# Patient Record
Sex: MALE
[De-identification: names, ages, dates, MRNs, and addresses within clinical notes are randomized per-mention and may not be internally consistent; named-entity substitution may affect disease eponyms.]

---

## 2020-03-01 ENCOUNTER — HOSPITAL ENCOUNTER (EMERGENCY)
Dept: HOSPITAL 75 - ER | Age: 35
Discharge: HOME | End: 2020-03-01
Payer: SELF-PAY

## 2020-03-01 VITALS — WEIGHT: 250 LBS | HEIGHT: 70 IN | BODY MASS INDEX: 35.79 KG/M2

## 2020-03-01 VITALS — SYSTOLIC BLOOD PRESSURE: 119 MMHG | DIASTOLIC BLOOD PRESSURE: 71 MMHG

## 2020-03-01 DIAGNOSIS — F41.9: Primary | ICD-10-CM

## 2020-03-01 DIAGNOSIS — Z86.59: ICD-10-CM

## 2020-03-01 DIAGNOSIS — F13.239: ICD-10-CM

## 2020-03-01 PROCEDURE — 99283 EMERGENCY DEPT VISIT LOW MDM: CPT

## 2020-03-01 NOTE — ED GENERAL
General


Chief Complaint:  General Problems/Pain


Stated Complaint:  SHAKING, HAS BEEN HAVING TREMORS


Nursing Triage Note:  


PT AMBULATE TO TRIAGE WITH C/O RUNNING OUT OF HIS CLONAZAPAM ON FRIDAY. PT 


STATES HIS APPT IS NOT UNTIL 03/30/20. PT STATES THAT THEY DID NOT GIVE HIM 


ENOUGH MEDS TO GET HIM THROUGH UNTIL HIS NEXT APPT. PT STATES HE HAS BEEN HAVING




TREMORS SINCE HE RAN OUT OF MEDS. PT STATES HE DID NOT CALL PROVIDER ABOUT 


GETTING MORE MEDS BECAUSE HE "DID NOT THINK ABOUT IT".


Nursing Sepsis Screen:  No Definite Risk





History of Present Illness


Date Seen by Provider:  Mar 1, 2020


Time Seen by Provider:  16:33





Allergies and Home Medications


Allergies


Coded Allergies:  


     No Known Drug Allergies (Unverified , 3/1/20)





Patient Home Medication List


Home Medication List Reviewed:  Yes





Review of Systems


Review of Systems


Constitutional:  no symptoms reported


EENTM:  no symptoms reported


Respiratory:  no symptoms reported


Cardiovascular:  no symptoms reported


Gastrointestinal:  no symptoms reported


Musculoskeletal:  no symptoms reported


Skin:  no symptoms reported


Psychiatric/Neurological:  Tremors





Past Medical-Social-Family Hx


Past Med/Social Hx:  Reviewed Nursing Past Med/Soc Hx


Patient Social History


Alcohol Use:  Regular Use


Alcohol Beverage of Choice:  Beer


Recreational Drug Use:  No


Smoking Status:  Never a Smoker


2nd Hand Smoke Exposure:  No


Recent Foreign Travel:  No


Contact w/Someone Who Travel:  No


Recent Infectious Disease Expo:  No


Recent Hopitalizations:  No


Physical Abuse:  No


Sexual Abuse:  No


Mistreated:  No


Fear:  No





Seasonal Allergies


Seasonal Allergies:  No





Past Medical History


Surgeries:  No


Respiratory:  No


Cardiac:  No


Neurological:  No


Genitourinary:  No


Gastrointestinal:  No


Musculoskeletal:  No


Endocrine:  No


HEENT:  No


Cancer:  No


Anxiety, Depression


Integumentary:  No


Blood Disorders:  No





Physical Exam


Vital Signs





Vital Signs - First Documented








 3/1/20





 16:16


 


Temp 36.8


 


Pulse 111


 


Resp 19


 


B/P (MAP) 119/71 (87)


 


O2 Delivery Room Air





Capillary Refill : Less Than 3 Seconds


Height, Weight, BMI


Height: '"


Weight: lbs. oz. kg; 35.00 BMI


Method:


General Appearance:  No Apparent Distress, WD/WN


Eyes:  Bilateral Eye Normal Inspection, Bilateral Eye PERRL


Respiratory:  Chest Non Tender, Lungs Clear, Normal Breath Sounds


Cardiovascular:  Regular Rate, Rhythm, No Edema


Gastrointestinal:  Non Tender, Soft


Back:  Normal Inspection, No CVA Tenderness


Neurologic/Psychiatric:  Other (patient with tremors/shaking)


Skin:  Normal Color, Warm/Dry





Progress/Results/Core Measures


Suspected Sepsis


Recent Fever Within 48 Hours:  No


Infection Criteria Present:  None


New/Unexplained  Altered Menta:  No


Sepsis Screen:  No Definite Risk


SIRS


Temperature: 


Pulse: 111 


Respiratory Rate: 19


 


Blood Pressure 119 /71 


Mean: 87





Results/Orders


My Orders





Orders - ALEKSANDRA CAMPUZANO DO


Clonazepam Tablet (Klonopin Tablet) (3/1/20 16:45)





Vital Signs/I&O











 3/1/20





 16:16


 


Temp 36.8


 


Pulse 111


 


Resp 19


 


B/P (MAP) 119/71 (87)


 


O2 Delivery Room Air





Capillary Refill : Less Than 3 Seconds








Blood Pressure Mean:                    87








Progress Note :  


   Time:  16:40


Progress Note


Patient has no previous visits does not exhibit drug-seeking behavior. Due to 

the risk of benzo withdrawal I will give him 5 - 0.5 mg clonazepam prescription.

Patient should call his primary care provider in the morning and get an 

appointment for refill and further management of his anxiety.





Departure


Impression





   Primary Impression:  


   Anxiety disorder


   Qualified Codes:  F41.1 - Generalized anxiety disorder


   Additional Impression:  


   Benzodiazepine withdrawal


   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 

   withdrawal, uncomplicated


Disposition:  01 HOME, SELF-CARE


Condition:  Stable





Departure-Patient Inst.


Referrals:  


NO,LOCAL PHYSICIAN (PCP/Family)


Primary Care Physician


Patient Instructions:  Anxiety, Adult (DC), Prescription Drug Misuse











ALEKSANDRA CAMPUZANO DO                Mar 1, 2020 16:34